# Patient Record
Sex: MALE
[De-identification: names, ages, dates, MRNs, and addresses within clinical notes are randomized per-mention and may not be internally consistent; named-entity substitution may affect disease eponyms.]

---

## 2020-12-21 ENCOUNTER — HOSPITAL ENCOUNTER (EMERGENCY)
Dept: HOSPITAL 39 - ER | Age: 30
Discharge: NURSING, REHAB OR LONG-TERM CARE | End: 2020-12-21
Payer: COMMERCIAL

## 2020-12-21 VITALS — DIASTOLIC BLOOD PRESSURE: 103 MMHG | TEMPERATURE: 97.4 F | OXYGEN SATURATION: 98 % | SYSTOLIC BLOOD PRESSURE: 148 MMHG

## 2020-12-21 DIAGNOSIS — S60.212A: ICD-10-CM

## 2020-12-21 DIAGNOSIS — F79: ICD-10-CM

## 2020-12-21 DIAGNOSIS — Z88.8: ICD-10-CM
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
## 2020-12-21 DIAGNOSIS — S60.222A: Primary | ICD-10-CM
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
## 2020-12-29 ENCOUNTER — HOSPITAL ENCOUNTER (EMERGENCY)
Dept: HOSPITAL 39 - ER | Age: 30
Discharge: NURSING, REHAB OR LONG-TERM CARE | End: 2020-12-29
Payer: COMMERCIAL

## 2020-12-29 VITALS — OXYGEN SATURATION: 97 %

## 2020-12-29 VITALS — SYSTOLIC BLOOD PRESSURE: 122 MMHG | TEMPERATURE: 97.9 F | DIASTOLIC BLOOD PRESSURE: 84 MMHG

## 2020-12-29 DIAGNOSIS — R41.89: ICD-10-CM

## 2020-12-29 DIAGNOSIS — Z87.820: ICD-10-CM

## 2020-12-29 DIAGNOSIS — R45.1: Primary | ICD-10-CM

## 2020-12-29 DIAGNOSIS — R56.9: ICD-10-CM

## 2020-12-29 DIAGNOSIS — F79: ICD-10-CM

## 2020-12-29 NOTE — ED.PDOC
History of Present Illness





- General


Chief Complaint: Behavioral / Psych


Stated Complaint: agitation


Time Seen by Provider: 12/29/20 18:29


Source: RN notes reviewed, Vital Signs reviewed, EMS notes reviewed, police


Exam Limitations: other - mental retardation





- History of Present Illness


Initial Comments: 





Patient presents from the nursing home secondary to agitation and concern that 

he may become violent.  Per EMS and police patient has been redirectable and 

nonviolent.  I am unable to obtain a history or review of systems secondary to 

patient's cognitive dysfunction from his brain injuries.


Allergies/Adverse Reactions: 


Allergies





Corticosteroids Adverse Reaction (Verified 12/21/20 12:47)


   








Review of Systems





- Review of Systems


Unable to Obtain Due To: other - Mental retardation


All other Systems: No Change from Baseline





Past Medical History (General)





- Patient Medical History


Hx Seizures: Yes





- Social History


Hx Alcohol Use:  - unknown





Family Medical History





- Family History


  ** Mother


Family History: Unknown





Physical Exam





- Physical Exam


General Appearance: Alert, Anxious, Well Developed, Well Groomed, Well Hydrated,

Well Nourished


Eye Exam: bilateral normal


Ears, Nose, Throat: hearing grossly normal, normal pharynx


Neck: non-tender, full range of motion, supple


Respiratory: chest non-tender, lungs clear, normal breath sounds, no respiratory

distress


Cardiovascular/Chest: normal peripheral pulses, regular rate, rhythm, no edema


Peripheral Pulses: radial,right: 2+, radial,left: 2+


Gastrointestinal/Abdominal: normal bowel sounds, non tender, soft


Back Exam: no CVA tenderness, no vertebral tenderness


Extremity: normal range of motion, non-tender


Neurologic: alert, normal mood/affect


Skin Exam: normal color, warm/dry


Lymphatic: no adenopathy





Progress





- Progress


Progress: 


Differential diagnosis: Medication reaction, benzodiazepine withdrawal, 

psychosis acute, ODD among others.








12/29/20 18:42


Patient has had 1 mg of IM Ativan and 1 mg p.o.  He is very calm and resting 

comfortably.  He has exhibited no violence.  Patient is not appropriate for 

psych eval until his cognitive levels are established by psych.  Plan on 

discharge back to the nursing home for further care by his PCP.








Zan Woodward M.D.


#[phone]














Departure





- Departure


Clinical Impression: 


 Agitation





Time of Disposition: 18:44


Disposition: Discharge to SNF


Condition: Good


Departure Forms:  ED Discharge - Pt. Copy, Patient Portal Self Enrollment


Instructions:  DI for Psychosis, Anxiety, Adult (DC)


Diet: resume usual diet


Activity: increase activity as tolerated


Referrals: 


Simón Borja MD [Primary Care Provider] - 1-5 Days

## 2021-01-03 ENCOUNTER — HOSPITAL ENCOUNTER (EMERGENCY)
Dept: HOSPITAL 39 - ER | Age: 31
Discharge: HOME | End: 2021-01-03
Payer: COMMERCIAL

## 2021-01-03 VITALS — SYSTOLIC BLOOD PRESSURE: 121 MMHG | DIASTOLIC BLOOD PRESSURE: 79 MMHG | OXYGEN SATURATION: 99 % | TEMPERATURE: 97.5 F
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
## 2021-01-03 DIAGNOSIS — Z88.8: ICD-10-CM
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
## 2021-01-03 DIAGNOSIS — R45.1: ICD-10-CM

## 2021-01-03 DIAGNOSIS — Z79.899: ICD-10-CM

## 2021-01-03 DIAGNOSIS — G40.A09: ICD-10-CM

## 2021-01-03 DIAGNOSIS — S60.222A: ICD-10-CM

## 2021-01-03 DIAGNOSIS — R46.89: ICD-10-CM

## 2021-01-03 DIAGNOSIS — F91.3: Primary | ICD-10-CM

## 2021-01-03 PROCEDURE — 73120 X-RAY EXAM OF HAND: CPT
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
## 2021-01-03 NOTE — RAD
EXAM:



Two view(s) of the left hand.



INDICATION:



Swelling.



COMPARISON: 



12/21/2020.



FINDINGS:



No acute fracture or dislocation.

Mild soft tissue swelling along the dorsum of the hand.



IMPRESSION:



1. No acute fracture.



Electronically signed by:  Hugh Rg MD  1/3/2021 3:49 AM [facility]

Workstation: [id number]

## 2021-01-07 ENCOUNTER — HOSPITAL ENCOUNTER (EMERGENCY)
Dept: HOSPITAL 39 - ER | Age: 31
Discharge: NURSING, REHAB OR LONG-TERM CARE | End: 2021-01-07
Payer: COMMERCIAL

## 2021-01-07 VITALS — SYSTOLIC BLOOD PRESSURE: 124 MMHG | TEMPERATURE: 98.6 F | DIASTOLIC BLOOD PRESSURE: 82 MMHG | OXYGEN SATURATION: 99 %

## 2021-01-07 DIAGNOSIS — R46.89: Primary | ICD-10-CM

## 2021-01-07 DIAGNOSIS — F89: ICD-10-CM

## 2021-01-07 DIAGNOSIS — Z79.899: ICD-10-CM

## 2021-01-07 DIAGNOSIS — G40.A09: ICD-10-CM
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
## 2021-01-28 ENCOUNTER — HOSPITAL ENCOUNTER (EMERGENCY)
Dept: HOSPITAL 39 - ER | Age: 31
Discharge: NURSING, REHAB OR LONG-TERM CARE | End: 2021-01-28
Payer: COMMERCIAL

## 2021-01-28 VITALS — DIASTOLIC BLOOD PRESSURE: 74 MMHG | SYSTOLIC BLOOD PRESSURE: 119 MMHG | TEMPERATURE: 97.5 F

## 2021-01-28 VITALS — OXYGEN SATURATION: 98 %

## 2021-01-28 DIAGNOSIS — Z79.899: ICD-10-CM
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
## 2021-01-28 DIAGNOSIS — G40.A09: ICD-10-CM

## 2021-01-28 DIAGNOSIS — S60.812A: ICD-10-CM

## 2021-01-28 DIAGNOSIS — R46.89: Primary | ICD-10-CM

## 2021-01-28 NOTE — RAD
EXAM: Radiographic Examination



COMPARISON: None.



INDICATION:  swelling and bruising



FINDINGS: 2 views of the left wrist demonstrate no acute fracture

or dislocation. Soft tissue swelling is present at the dorsal

aspect of the wrist.



IMPRESSION:

Soft tissue swelling without acute osseous findings.



Electronically signed by:  Won Florentino MD  1/28/2021 4:51 AM [facility]

Workstation: [id number]

## 2021-01-28 NOTE — ED.PDOC
History of Present Illness





- General


Chief Complaint: Behavioral / Psych


Stated Complaint: behavioral issues


Time Seen by Provider: 21 04:24


Source: patient


Exam Limitations: no limitations





- History of Present Illness


Initial Comments: 





The patient is a 30-year-old male presented emergency room secondary to 

aggressive behavior.  This is a longstanding issue with this patient at the 

nursing home.  He apparently did have as needed orders for Haldol and Ativan 

that have .  The patient does have numerous scheduled medications for his

behavior.  As with at least 3 previous visits, by the time he arrives here he is

calm and cooperative.  He appears to be in his baseline level of health with the

exception that he has some swelling around the left wrist with a mild abrasion 

as well.  Passive and active range of motion appear to be preserved and he 

appears to be neurovascularly intact there.  He is currently pleasant and 

cooperative.  He appears to be at his baseline mental state.


Timing/Duration: 1 hour


Severity: moderate


Allergies/Adverse Reactions: 


Allergies





Corticosteroids Adverse Reaction (Verified 21 02:58)


   





Home Medications: 


Ambulatory Orders





ALPRAZolam [Xanax] 0.5 mg PO BID 21 


Carbamazepine [Tegretol] 200 mg PO BID 21 


Risperidone [Risperdal] 3 mg PO BEDTIME 21 


Valproate Sodium [Valproic Acid] 500 mg PO BID 21 


risperiDONE [RisperDAL] 3 mg PO DAILY 21 


Ciprofloxacin [Cipro] 500 mg PO BID #14 tab 21 











Review of Systems





- Review of Systems


Review of Systems: 





21 05:07


Patient unable to give review of systems secondary to baseline mental state.





Past Medical History (General)





- Patient Medical History


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 

epilepticus


Hx Stroke: No


Hx Dementia: No


Hx Asthma: No


Hx of COPD: No


Hx Cardiac Disorders: No


Hx Congestive Heart Failure: No


Hx Pacemaker: No


Hx Hypertension: No


Hx Thyroid Disease: No


Hx Diabetes: No


Hx Gastroesophageal Reflux: No


Hx Renal Disease: No


Hx Cancer: No


Hx of HIV: No


Hx Hepatitis C: No


Hx MRSA: No





- Vaccination History


Hx Tetanus, Diphtheria Vaccination: Yes


Hx Influenza Vaccination: Yes


Hx Pneumococcal Vaccination: No


Immunizations Up to Date: Yes





- Social History


Hx Tobacco Use: No


Hx Alcohol Use: No - unknown


Hx Substance Use: No


Hx Substance Use Treatment: No


Hx Depression: No





- Activities of Daily Living


Nursing Home/Assisted Living (if applicable):: [facility]





Family Medical History





- Family History


  ** Mother


Family History: Unknown





Physical Exam





- Physical Exam


General Appearance: Alert, Comfortable, No apparent distress


Eye Exam: bilateral normal


Ears, Nose, Throat: hearing grossly normal, other - Chronically poor dentition


Neck: non-tender, supple


Respiratory: lungs clear, normal breath sounds, no respiratory distress, no 

accessory muscle use


Cardiovascular/Chest: normal peripheral pulses, no edema, other - Regular rate


Peripheral Pulses: radial,right: 2+


Gastrointestinal/Abdominal: non tender, soft


Rectal Exam: deferred


Back Exam: no CVA tenderness, no vertebral tenderness


Extremity: no pedal edema, no calf tenderness, normal capillary refill, other - 

Chronic right upper extremity contracture.  Swelling to the left wrist pain 

above.


Neurologic: CNs II-XII nml as tested, alert, normal mood/affect


Skin Exam: other - Abrasion and mild swelling to the left wrist.


Comments: 





                               Vital Signs - 24 hr











  21





  04:18


 


Temperature 97.0 F L


 


Pulse Rate [ 80





Left Radial] 


 


Respiratory 16





Rate 


 


Blood Pressure 132/90





[Left Arm] 


 


O2 Sat by Pulse 98





Oximetry 














Progress





- Progress


Progress: 





21 05:08


The patient is a 30-year-old -American male presenting to the emergency 

room from the nursing home secondary to aggressive behavior.  As with previous 

visits, by the time he arrives here his behavior is essentially back to normal. 

He did go ahead and receive 1 mg of oral Ativan, which he has had before.  The 

patient does have some swelling and a minor abrasion to the left wrist.  This is

no doubt from his aggressive outburst.  No evidence of fracture on x-ray and no 

evidence of dislocation.  I am not putting a wrist brace in place as this will 

likely only aggravate him more.  The patient will be transferred back to the 

long-term care facility.  His primary care doctor does need to be notified in 

the morning of his visit here, so consideration can be given to resuming as 

needed medications.  ER warnings are given.





kecia owen [phone]





- Results/Orders


Results/Orders: 





X-ray of the left wrist shows no bony pathology.  There is soft tissue swelling.





Departure





- Departure


Clinical Impression: 


 Aggression





Left wrist injury


Qualifiers:


 Encounter type: initial encounter Qualified Code(s): S69.92XA - Unspecified 

injury of left wrist, hand and finger(s), initial encounter





Disposition: Discharge to SNF


Condition: Fair


Departure Forms:  ED Discharge - Pt. Copy, Patient Portal Self Enrollment


Instructions:  DI for Psychosis, Wrist Sprain (DC)


Diet: regular diet


Activity: increase activity as tolerated


Referrals: 


Simón Bojra MD [Primary Care Provider] - 1-2 Weeks


Home Medications: 


Ambulatory Orders





ALPRAZolam [Xanax] 0.5 mg PO BID 21 


Carbamazepine [Tegretol] 200 mg PO BID 21 


Risperidone [Risperdal] 3 mg PO BEDTIME 21 


Valproate Sodium [Valproic Acid] 500 mg PO BID 21 


risperiDONE [RisperDAL] 3 mg PO DAILY 21 


Ciprofloxacin [Cipro] 500 mg PO BID #14 tab 21 








Additional Instructions: 


The patient is a 30-year-old -American male presenting to the emergency 

room from the nursing home secondary to aggressive behavior.  As with previous 

visits, by the time he arrives here his behavior is essentially back to normal. 

 He did go ahead and receive 1 mg of oral Ativan, which he has had before.  The 

patient does have some swelling and a minor abrasion to the left wrist.  This is

 no doubt from his aggressive outburst.  No evidence of fracture on x-ray and no

 evidence of dislocation.  I am not putting a wrist brace in place as this will 

likely only aggravate him more.  The patient will be transferred back to the 

long-term care facility.  His primary care doctor does need to be notified in 

the morning of his visit here, so consideration can be given to resuming as 

needed medications.  ER warnings are given.

## 2021-03-02 ENCOUNTER — HOSPITAL ENCOUNTER (OUTPATIENT)
Dept: HOSPITAL 39 - GOCC | Age: 31
End: 2021-03-02
Attending: FAMILY MEDICINE
Payer: COMMERCIAL

## 2021-03-02 DIAGNOSIS — F06.2: ICD-10-CM

## 2021-03-02 DIAGNOSIS — Z51.81: Primary | ICD-10-CM

## 2021-03-02 DIAGNOSIS — G40.A09: ICD-10-CM
